# Patient Record
Sex: FEMALE
[De-identification: names, ages, dates, MRNs, and addresses within clinical notes are randomized per-mention and may not be internally consistent; named-entity substitution may affect disease eponyms.]

---

## 2017-04-06 ENCOUNTER — CHARTING TRANS (OUTPATIENT)
Dept: OTHER | Age: 32
End: 2017-04-06

## 2017-04-06 ENCOUNTER — LAB SERVICES (OUTPATIENT)
Dept: OTHER | Age: 32
End: 2017-04-06

## 2017-04-06 LAB — RAPID STREP GROUP A: NORMAL

## 2017-04-06 ASSESSMENT — PAIN SCALES - GENERAL: PAINLEVEL_OUTOF10: 1

## 2018-11-04 VITALS
OXYGEN SATURATION: 97 % | WEIGHT: 125 LBS | HEART RATE: 91 BPM | HEIGHT: 71 IN | BODY MASS INDEX: 17.5 KG/M2 | TEMPERATURE: 98.9 F

## 2022-11-22 ENCOUNTER — APPOINTMENT (OUTPATIENT)
Dept: GENERAL RADIOLOGY | Age: 37
End: 2022-11-22
Attending: NURSE PRACTITIONER
Payer: COMMERCIAL

## 2022-11-22 ENCOUNTER — HOSPITAL ENCOUNTER (OUTPATIENT)
Age: 37
Discharge: HOME | End: 2022-11-22
Payer: COMMERCIAL

## 2022-11-22 VITALS
OXYGEN SATURATION: 98 % | HEART RATE: 92 BPM | RESPIRATION RATE: 18 BRPM | DIASTOLIC BLOOD PRESSURE: 79 MMHG | TEMPERATURE: 99 F | SYSTOLIC BLOOD PRESSURE: 133 MMHG

## 2022-11-22 DIAGNOSIS — J06.9 UPPER RESPIRATORY VIRUS: Primary | ICD-10-CM

## 2022-11-22 LAB
B-HCG UR QL: NEGATIVE
POCT INFLUENZA A: NEGATIVE
POCT INFLUENZA B: NEGATIVE

## 2022-11-22 PROCEDURE — 87502 INFLUENZA DNA AMP PROBE: CPT | Performed by: NURSE PRACTITIONER

## 2022-11-22 PROCEDURE — 71046 X-RAY EXAM CHEST 2 VIEWS: CPT | Performed by: NURSE PRACTITIONER

## 2022-11-22 PROCEDURE — 99203 OFFICE O/P NEW LOW 30 MIN: CPT | Performed by: NURSE PRACTITIONER

## 2022-11-22 PROCEDURE — 81025 URINE PREGNANCY TEST: CPT | Performed by: NURSE PRACTITIONER

## 2022-11-22 RX ORDER — LEVOTHYROXINE SODIUM 0.03 MG/1
25 TABLET ORAL DAILY
COMMUNITY
Start: 2022-10-10

## 2022-11-22 NOTE — ED INITIAL ASSESSMENT (HPI)
PATIENT PRESENTS WITH COMPLAINTS OF COUGH MUCUS PRODUCTION BODY ACHES CHILLS FOR THE LAST 4 DAYS FOLLOWING BEING EXPOSED TO FLU POSITIVE PERSONS. ENDORSES LOW GRADE FEVER A FEW DAYS AGO AND USE OF ADVIL FOR COMPLAINTS.

## 2024-09-10 ENCOUNTER — HOSPITAL ENCOUNTER (OUTPATIENT)
Age: 39
Discharge: LEFT BEFORE DISCHARGE | End: 2024-09-10
Payer: COMMERCIAL

## 2024-09-10 VITALS
OXYGEN SATURATION: 98 % | HEART RATE: 86 BPM | DIASTOLIC BLOOD PRESSURE: 75 MMHG | TEMPERATURE: 98 F | SYSTOLIC BLOOD PRESSURE: 112 MMHG | RESPIRATION RATE: 18 BRPM

## 2024-09-10 DIAGNOSIS — R07.89 CHEST PAIN, ATYPICAL: ICD-10-CM

## 2024-09-10 DIAGNOSIS — J06.9 VIRAL URI: Primary | ICD-10-CM

## 2024-09-10 PROCEDURE — 99215 OFFICE O/P EST HI 40 MIN: CPT | Performed by: NURSE PRACTITIONER

## 2024-09-10 NOTE — DISCHARGE INSTRUCTIONS
You were seen in immediate care today for cough and some chest discomfort with a cough.  You had a normal exam.  We also checked a D-dimer blood test to rule out a blood clot in your lung.  You are at higher risk since you just gave birth.  The results of D-dimer did test positive.  Results were discussed with you and suggestion was made to go to the hospital for a chest CT to rule out a PE.  You are unable to go at this time.  You may go to the emergency department at any time.  You understand the risks of not receiving that test.

## 2024-09-10 NOTE — ED PROVIDER NOTES
Patient Seen in: [facility] [place]      History     Chief Complaint   Patient presents with    Cough     Stated Complaint: Chest Congestion Pain; Cough    Subjective:   HPI    39-year-old female presents with cough x 2 days.  She states she feels some discomfort in her chest and with inspiration.  She is afebrile.  She is 3 weeks postpartum.    Objective:   History reviewed. No pertinent past medical history.           Past Surgical History:   Procedure Laterality Date    Tonsillectomy                  Social History     Socioeconomic History    Marital status:    Tobacco Use    Smoking status: Never     Passive exposure: Never    Smokeless tobacco: Never     Social Determinants of Health     Financial Resource Strain: Low Risk  (8/22/2024)    Received from [facility]    Overall Financial Resource Strain (CARDIA)     Difficulty of Paying Living Expenses: Not hard at all   Food Insecurity: Low Risk  (8/22/2024)    Received from [facility]     Within the past 12 months, the food you bought just didn't last and you didn't have money to get more.: 3     Within the past 12 months, you worried that your food would run out before you got money to buy more.: 3   Transportation Needs: Not At Risk (8/22/2024)    Received from [facility] Transportation Needs     In the past 12 months, has lack of reliable transportation kept you from medical appointments, meetings, work or from getting things needed for daily living?: No   Physical Activity: Inactive (8/22/2024)    Received from [facility]    Physical Activity     On average, how many days per week do you engage in moderate to strenuous exercise (like a brisk walk)?: 0 days     On average, how many minutes do you engage in exercise at this level?: 0 min   Stress: No Stress Concern Present (8/22/2024)    Received from [facility]    [employer] - Occupational Stress Questionnaire     Feeling of  Stress : Not at all   Social Connections: Moderately Isolated (8/22/2024)    Received from [facility]    Social Connection and Isolation Panel [NHANES]     Frequency of Communication with Friends and Family: More than three times a week     Frequency of Social Gatherings with Friends and Family: Twice a week     Attends Taoism Services: Never     Active Member of Clubs or Organizations: No     Attends Club or Organization Meetings: Never     Marital Status:    Housing Stability: Not At Risk (8/22/2024)    Received from [facility]     What is your living situation today?: I have a steady place to live     Think about the place you live. Do you have problems with any of the following?: None of the above              Review of Systems    Positive for stated Chief Complaint: Cough    Other systems are as noted in HPI.  Constitutional and vital signs reviewed.      All other systems reviewed and negative except as noted above.    Physical Exam     ED Triage Vitals [09/10/24 1316]   /75   Pulse 86   Resp 18   Temp 98.2 °F (36.8 °C)   Temp src Temporal   SpO2 98 %   O2 Device None (Room air)       Current Vitals:   Vital Signs  BP: 112/75  Pulse: 86  Resp: 18  Temp: 98.2 °F (36.8 °C)  Temp src: Temporal    Oxygen Therapy  SpO2: 98 %  O2 Device: None (Room air)            Physical Exam  Vitals reviewed.   Constitutional:       General: She is not in acute distress.     Appearance: She is not ill-appearing.   HENT:      Nose: Nose normal.      Mouth/Throat:      Mouth: Mucous membranes are moist.      Pharynx: No oropharyngeal exudate or posterior oropharyngeal erythema.   Cardiovascular:      Rate and Rhythm: Normal rate and regular rhythm.   Pulmonary:      Effort: Pulmonary effort is normal.      Breath sounds: Normal breath sounds.   Musculoskeletal:         General: Normal range of motion.   Skin:     General: Skin is warm and dry.   Neurological:      General: No focal deficit  present.      Mental Status: She is alert and oriented to person, place, and time.   Psychiatric:         Mood and Affect: Mood normal.         Behavior: Behavior normal.               ED Course     Labs Reviewed   D-DIMER (POC)                        MDM                                         Medical Decision Making  39-year-old female presents with cough and chest discomfort.  Differential diagnosis includes viral upper respiratory infection, pneumonia, reactive airway disease, pulmonary embolism.  Patient is 3 weeks postpartum.  She states 2 days ago she started with a cough and noticed some discomfort in her chest with deep inspiration.  These findings were discussed with patient.  We will check a D-dimer.  D-dimer in immediate care was 951.  Results were discussed with patient.  She was informed that that is an abnormal test result.  This could indicate that she may have a pulmonary embolism.  She was instructed she should go to the hospital for a chest CT.  Patient is declining at this time.  Risks were discussed with patient.  She was also informed that she may go to the emergency department at any time.  She states she will will and she would likely go to [place] emergency department.    Amount and/or Complexity of Data Reviewed  Labs: ordered.     Details: D- dimer 951.    Risk  OTC drugs.        Disposition and Plan     Clinical Impression:  1. Viral URI    2. Chest pain, atypical         Disposition:  [place]  9/10/2024  2:15 pm    Follow-up:  [place]  [address] [place] [id number] [phone]      If symptoms worsen          Medications Prescribed:  Current Discharge Medication List

## 2024-09-10 NOTE — ED INITIAL ASSESSMENT (HPI)
Headache, fatigue, Pain with coughing, productive cough, low grade fever may possibly be from breastfeeding a 3 week old, x 1week

## 2024-10-17 LAB — DDIMER WHOLE BLOOD: 961 NG/ML DDU (ref ?–400)

## 2025-05-21 ENCOUNTER — HOSPITAL ENCOUNTER (OUTPATIENT)
Age: 40
Discharge: HOME | End: 2025-05-21
Payer: COMMERCIAL

## 2025-05-21 VITALS
TEMPERATURE: 98 F | SYSTOLIC BLOOD PRESSURE: 129 MMHG | DIASTOLIC BLOOD PRESSURE: 77 MMHG | HEART RATE: 72 BPM | OXYGEN SATURATION: 100 % | RESPIRATION RATE: 12 BRPM

## 2025-05-21 DIAGNOSIS — Z51.89 VISIT FOR WOUND CHECK: Primary | ICD-10-CM

## 2025-05-21 DIAGNOSIS — Z48.02 ENCOUNTER FOR REMOVAL OF SUTURES: ICD-10-CM

## 2025-05-21 PROCEDURE — 99212 OFFICE O/P EST SF 10 MIN: CPT | Performed by: PHYSICIAN ASSISTANT

## 2025-05-21 NOTE — ED PROVIDER NOTES
Patient Seen in: [facility] [place]        History  Chief Complaint   Patient presents with    Suture Removal     Entered by patient     Stated Complaint: Suture Removal    Subjective:   HPI            Patient is a 40-year-old female that presents to immediate care for suture removal.  Patient states that she had basal cell carcinoma surgically removed from [facility] dermatology roughly 1 month ago.  Patient states that she was given a suture removal kit and was instructed to remove it 2 weeks later.  Patient states that she has 1 residual suture and is unable to remove it at home.  Denies pain bleeding dehiscence, fever.      Objective:     History reviewed. No pertinent past medical history.           Past Surgical History:   Procedure Laterality Date    Tonsillectomy                  Social History     Socioeconomic History    Marital status:    Tobacco Use    Smoking status: Never     Passive exposure: Never    Smokeless tobacco: Never   Vaping Use    Vaping status: Never Used   Substance and Sexual Activity    Alcohol use: Yes     Comment: rarely    Drug use: Never     Social Drivers of Health     Food Insecurity: Low Risk  (1/28/2025)    Received from [facility]     Within the past 12 months, the food you bought just didn't last and you didn't have money to get more.: 3     Within the past 12 months, you worried that your food would run out before you got money to buy more.: 3   Transportation Needs: Not At Risk (1/28/2025)    Received from [facility] Transportation Needs     In the past 12 months, has lack of reliable transportation kept you from medical appointments, meetings, work or from getting things needed for daily living?: No   Housing Stability: Not At Risk (1/28/2025)    Received from [facility]     What is your living situation today?: I have a steady place to live     Think about the place you live. Do you have problems with any of  the following?: None of the above              Review of Systems    Positive for stated complaint: Suture Removal  Other systems are as noted in HPI.  Constitutional and vital signs reviewed.      All other systems reviewed and negative except as noted above.                  Physical Exam    ED Triage Vitals [05/21/25 1344]   /77   Pulse 72   Resp 12   Temp 98.1 °F (36.7 °C)   Temp src Oral   SpO2 100 %   O2 Device None (Room air)       Current Vitals:   Vital Signs  BP: 129/77  Pulse: 72  Resp: 12  Temp: 98.1 °F (36.7 °C)  Temp src: Oral    Oxygen Therapy  SpO2: 100 %  O2 Device: None (Room air)            Physical Exam  Vital signs reviewed. Nursing note reviewed.  Constitutional: Well-developed. Well-nourished. In no acute distress  HENT: Mucous membranes moist.   EYES: No scleral icterus or conjunctival injection.  NECK: Full ROM. Supple.   PULM/CHEST:  No wheezes  Extremities: Full ROM  NEURO: Awake, alert, following commands, moving extremities, answering questions.   SKIN: Warm and dry.  Healed surgical incision of left upper arm. Suture strings located at proximal end.  No dehiscence, tenderness surrounding erythema warmth  PSYCH: Normal judgment. Normal affect.               MDM     Patient is a 40-year-old female who presents to immediate care for wound check, suture removal after sutures placed 1 month ago.  Patient arrives with stable vitals.  Physical exam showing 2 separate suture strings located on the left upper arm with healed surgical incision site.  Discussed with patient sutures appear to be subcutaneous, suture was unsuccessfully removed with suture kit.  Encourage patient to follow-up with [facility] dermatology for further management of suture removal.  Unlikely wound dehiscence, wound infection.  Patient agreeable to plan stating that she will follow-up with her dermatologist. History given by patient.        Medical Decision Making      Disposition and Plan     Clinical  Impression:  1. Visit for wound check    2. Encounter for removal of sutures         Disposition:  Discharge  5/21/2025  2:00 pm    Follow-up:  No follow-up provider specified.        Medications Prescribed:  Discharge Medication List as of 5/21/2025  2:01 PM                Supplementary Documentation:

## 2025-05-21 NOTE — ED INITIAL ASSESSMENT (HPI)
Pt states had basal cell removed from L upper arm 4/26/25 at [facility]. States told to remove the stiches at home 2 weeks later.  States removed the other sutures and has 1 suture that she is unable to remove and is painful.  No fever.  No drainage.